# Patient Record
Sex: FEMALE | Race: WHITE | NOT HISPANIC OR LATINO | Employment: FULL TIME | URBAN - METROPOLITAN AREA
[De-identification: names, ages, dates, MRNs, and addresses within clinical notes are randomized per-mention and may not be internally consistent; named-entity substitution may affect disease eponyms.]

---

## 2018-03-01 ENCOUNTER — TELEPHONE (OUTPATIENT)
Dept: GASTROENTEROLOGY | Facility: CLINIC | Age: 48
End: 2018-03-01

## 2018-03-01 NOTE — TELEPHONE ENCOUNTER
Attempted to contact Chanell  without success.    Left message for Chanell to call the clinic back regarding patient's new patient appointment.

## 2018-03-01 NOTE — TELEPHONE ENCOUNTER
----- Message from Pam Tipton sent at 3/1/2018  9:19 AM CST -----  Contact: Chanell valdovinos/ [facility]  [phone]  Caroline Lua is asking to speak with Monika to get a update on an appt for pt. Please call Chanell

## 2018-03-02 ENCOUNTER — TELEPHONE (OUTPATIENT)
Dept: GASTROENTEROLOGY | Facility: CLINIC | Age: 48
End: 2018-03-02

## 2018-03-02 NOTE — TELEPHONE ENCOUNTER
Spoke with Chanell from Dr. Restrepo office.    Patient is scheduled to see Dr. Velazquez 4/2.    Patient is on the cancellation list for sooner appointment if one may appear open.

## 2018-03-05 ENCOUNTER — TELEPHONE (OUTPATIENT)
Dept: GASTROENTEROLOGY | Facility: CLINIC | Age: 48
End: 2018-03-05

## 2018-03-05 NOTE — TELEPHONE ENCOUNTER
Called aryan at [facility] and offered her patient a sooner appointment.    Patient is unable to make the appointment and will keep the one scheduled.

## 2018-03-05 NOTE — TELEPHONE ENCOUNTER
----- Message from Gertrude Baird sent at 3/5/2018 12:03 PM CST -----  Contact: Chanell with [facility]:[phone]  Ms. Lua with [facility] called and states she would like to say thank you and the pt will keep her appointment because the pt can't make it in tomorrow.

## 2018-04-01 NOTE — PROGRESS NOTES
[facility] Consultation Note    Reason for Consult:    Chief Complaint   Patient presents with    Nausea    Abdominal Pain    Gas    Bloated    Constipation    Weight Loss         PCP:   Chaim Castillo   [address] [facility] [place] [facility]*    Referring MD:  Rod Restrepo Jr., Md  [address]  [facility]  [place] [address]      HPI:  Chanell Jimenez is a 47 y.o. female with a PMH of anemia, asthma, back pain,  referred to motility clinic for second opinion regarding the following problems:    GERD. Pyrosis, regurgitation. Well controlled with Nexium 40 mg once daily. occ takes it BID PRN.     Nausea.  Early satiety  Frequency:constant  Onset: 2007    Vomiting  Frequency:few days per month  Onset:2007  Timing of vomiting:  Within 30 min of eating:yes   Within 3 hours after eating: no. Hours after eating when s/s started in 2007       Cyclical episodes of n/v with symptom free intervals between episodes:yes. S/s free x 4-5 months. N/v x 6-8 months  History of migraines:no PH, no FH  Marijuana use:no  Unusual bathing behaviors:no    Improves with:  some improvement with zofran 4 mg-8 mg every 6 hours  Some improvement with phenergan 12.5 -25 mg every other night  Has not tried compazine, scopolamine   No improvement reglan 10 mg TID x few months in 2007. No extrapyramidal s/e.  No improvement with erythromycin in 2007.     Some improvement with domperidone 20 mg QID. Started in 2007.  Back on it since 10/2017.  Lost efficacy over time    Gastroparesis diagnosed:  2007. After having URI  Etiology:    Dm-no  Idopathic  Postsurgical-no  Parkinsonism-no  Amyloidosis-no  Paraneoplastic disease-no  Scleroderma-no  Mesenteric ischemia-no    History of:  Prior gastric or bariatric surgery: no  Diabetes mellitus: no  Thyroid dysfunction: no  Neurological disease: no   Autoimmune disorders: no, but autoimmune crisis  s/p tubal coils for BC. Resolved after hysterectomy    Number of GP related hospitalization in the past year: none  Number of GP related ED visit in the past year: none    Interventions: (pyloroplasty, botox):some improvement with pyloric botox in 12/2016, and 3/12/2018. Eats 4-5 small portions throughout day. Avoids ground meat  No Gastric Stimulator       Curenlty on following medications that may affect gastric emptying:   Narcotics (morphine, oxycodone, tapentadol, less with tramadol): hydrocodone 7.5 mg once monthly, trazodone 50 mg HS  Anticholinergics: levsin 4-5 days monthly  Cyclosporine:no  Diabetic medications (GLP-1 analogs, Exenatide, Lixisenatide, Livaglutide, Albiglutide, DulaglutatidePramlintide - SymlinPen (injection)):no    Abdominal pain. Reports abdominal pain  Character:burning, pressure  Location:epigastric/RUQ; radiates to back  Frequency:  3-4 days weekly  Duration:30 min-few hours  Onset:since HS, but worse in 2007  Worse with:after meals.   Improves with:levsin PRN,   Associated with Bm: no  Nocturnal pain: no  Has not tried Bentyl,Levbid, IBgurd.  Antidepressants: trintellix, trazodone  Using narcotic pain medication: hydrocodone      Gas and bloating. Bothersome.  Bloating: yes  Excessive gas: on occ  Abdominal distension: yes   Belching: on occ.  Symptoms get worse after meals: yes  Symptoms get worse as the day progresses:  yes  Consumes lactose:few  Consumes artificial sugars:yes.3-4 diet sodas daily.     Constipation. Intermittently since 2007 with worsening in 2014 s/p hysterectomy. Stephenson type 1 BMs. 4-5 days weekly. Some straining. occ feelings of incomplete evacuation. No sensation of anorectal blockage. No digital evacuation.no trauma. Some improvement with linzess 145 mcg PRN. Some improvement with colace BID x few months few months ago. Has not tried trulance, amitiza, lactulose, miralax, fiber.    Weight. 5-6 lbs wt loss in the last month. Eating less due to s/s. Food  aversion.    Anxiety. Mild depression. Well controlled with Trintellix 10 mg once daily x 4 year per ob/gyn. Has seen psychiatrist and psychologist in 2004 s/p divorce.    Insomnia. Well controlled with trazodone 50 mg once nightly. Has not seen sleep specialist.     Anemia. Iron deficient. Previous GI w/o unrevealing (EGD/colon/VCE).    Denies dysphagia,  diarrhea,BRBPR, melena,     Total visit time was 90 minutes, more than 50% of which was spent in face-to-face counseling with patient regarding symptoms, diagnostic results, prognosis, risks and benefits of treatment options, instructions for management, importance of compliance with chosen treatment options, risk factor reduction, stress reduction, coping strategies.      Previous Studies:   EGD 3/12/2017: EGD 3/12/2018: Nl esophagus.  Two polyps in gastric body cw w fundic gland polyps.  100U Botox injected to pylorus.  Nl duodenum.    EGD 12/12/2016: NL esophagus. NL duodenum. Bilious fluid in gastric body otherwise nL (-). botox 100 u injected into pyloric canal.  abd us 12/2/2016: NL    Colon 2/10/2011: GPTTI. NL colon and TI. Rpt in 10 yrs.  EGD 5/2/2007: NL esophagus. NL stomach (mild chr gastritis). NL duodenum (benign mucosal lymphoid nodule). aperistaltic stomach and duodenum.   GES 10/28/08: NL. T 1/2 88 min.   abd us 7/7/08: NL  GES 6/12/07: gastroparesis. Persistent abn gastric emptying time. T 1/2 186 min.   CT abd/plevis 5/16/07: NL  GES 5/15/07:abn gastric emptying. Half time of gastri cemptying less than 50% at two hours   HIDA 5/13/07: NL  CT abd/pelvis 5/11/07: NL     ROS:  ROS   Constitutional: No fevers, no chills, no night sweats, + weight loss  ENT: + congestion, no rhinorrhea, + chronic sinus problems  CV: No chest pain, no palpitations  Pulm: No cough, no shortness of breath  Ophtho: No blurry vision, no eye redness  GI: see HPI  Derm: No rash  Heme: No lymphadenopathy, no bruising  MSK: No arthritis, no joint swelling, no Raynauds  : No  dysuria, no frequent urination, no blood in urine  Endo: No hot or cold intolerance  Neuro: No dizziness, no syncope, no seizure  Psych: + anxiety, no depression        Medical History:   Past Medical History:   Diagnosis Date    Asthma     Gastroparesis     GERD (gastroesophageal reflux disease)         Surgical History:   Past Surgical History:   Procedure Laterality Date    ANTERIOR CERVICAL CORPECTOMY W/ FUSION      BREAST SURGERY      COLONOSCOPY  2011    ESOPHAGOGASTRODUODENOSCOPY  03/2018    HAND TENDON SURGERY      HYSTERECTOMY      KNEE ARTHROSCOPY      stapedectomy      d/t otosclerosis. hearing aids in place    TRIGGER FINGER RELEASE          Family History:   Family History   Problem Relation Age of Onset    Irritable bowel syndrome Mother     Breast cancer Maternal Grandmother     Celiac disease Neg Hx     Colon cancer Neg Hx     Crohn's disease Neg Hx     Esophageal cancer Neg Hx     Inflammatory bowel disease Neg Hx     Liver cancer Neg Hx     Rectal cancer Neg Hx     Stomach cancer Neg Hx     Ulcerative colitis Neg Hx         Social History:   Social History     Social History    Marital status: Single     Spouse name: N/A    Number of children: N/A    Years of education: N/A     Social History Main Topics    Smoking status: Never Smoker    Smokeless tobacco: Never Used    Alcohol use 1.2 oz/week     2 Glasses of wine per week      Comment: few days weekly    Drug use: No    Sexual activity: Not Asked     Other Topics Concern    None     Social History Narrative    None        Review of patient's allergies indicates:  Allergies not on file    Current Outpatient Prescriptions   Medication Sig Dispense Refill    ALBUTEROL INHL Inhale into the lungs every 4 (four) hours as needed.      DOMPERIDONE, BULK, MISC 20 mg by Misc.(Non-Drug; Combo Route) route 4 (four) times daily before meals and nightly.      esomeprazole (NEXIUM) 40 MG capsule 40 mg once daily.        "fluticasone (FLONASE) 50 mcg/actuation nasal spray 1 spray once daily.       hydrocodone-acetaminophen 7.5-325mg (NORCO) 7.5-325 mg per tablet 1 tablet every 8 (eight) hours as needed.       hyoscyamine (LEVSIN/SL) 0.125 mg Subl Place 0.125 mg under the tongue every 6 (six) hours as needed.      levocetirizine (XYZAL) 5 MG tablet Take 5 mg by mouth every evening.  11    linaclotide (LINZESS) 145 mcg Cap capsule Take 145 mcg by mouth once daily.      mirabegron (MYRBETRIQ) 50 mg Tb24 Take by mouth once daily.      montelukast (SINGULAIR) 10 mg tablet 10 mg every evening.       ondansetron (ZOFRAN) 4 MG tablet every 6 (six) hours as needed.       predniSONE (DELTASONE) 10 MG tablet Take 10 mg by mouth once daily.  2    PREMARIN 0.9 mg Tab 0.9 mg once daily.       promethazine (PHENERGAN) 25 MG tablet 25 mg every 6 (six) hours as needed.       SYMBICORT 160-4.5 mcg/actuation HFAA Inhale 2 puffs into the lungs 2 (two) times daily.  11    traZODone (DESYREL) 50 MG tablet 50 mg every evening.       vortioxetine (TRINTELLIX) 10 mg Tab Take 10 mg by mouth once daily.       No current facility-administered medications for this visit.         Objective Findings:  Vital Signs:  /81   Pulse 80   Ht 5' 4" (1.626 m)   Wt 50.1 kg (110 lb 7.2 oz)   BMI 18.96 kg/m²   Body mass index is 18.96 kg/m².    Physical Exam:  General appearance: alert, cooperative, no distress  HENT: Normocephalic, atraumatic, neck symmetrical, no nasal discharge  Eyes: conjunctivae/corneas clear, PERRL, EOM's intact  Lungs: clear to auscultation bilaterally, no dullness to percussion bilaterally  Heart: regular rate and rhythm without rub; no displacement of the PMI  Abdomen: soft, non-tender; bowel sounds normoactive; no organomegaly  Extremities: extremities symmetric; no clubbing, cyanosis, or edema  Integument: Skin color, texture, turgor normal; no rashes; hair distrubution normal  Neurologic: Alert and oriented X 3, normal " strength, normal coordination and gait  Psychiatric: no pressured speech; normal affect; no evidence of impaired cognition    Labs:  Lab Results   Component Value Date    WBC 7.17 04/02/2018    HGB 12.8 04/02/2018    HCT 37.6 04/02/2018    MCV 92 04/02/2018     04/02/2018     Lab Results   Component Value Date    FERRITIN 59 04/02/2018     Lab Results   Component Value Date     (L) 04/02/2018    K 4.3 04/02/2018    CL 97 04/02/2018    CO2 30 (H) 04/02/2018    GLU 94 04/02/2018    BUN 10 04/02/2018    CREATININE 0.7 04/02/2018    CALCIUM 9.7 04/02/2018    PROT 8.0 04/02/2018    ALBUMIN 3.9 04/02/2018    BILITOT 0.4 04/02/2018    ALKPHOS 54 (L) 04/02/2018    AST 17 04/02/2018    ALT 14 04/02/2018     Lab Results   Component Value Date    TSH 1.146 04/02/2018     No results found for: SEDRATE  No results found for: CRP  No results found for: LABA1C, HGBA1C        Assessment and Plan:  Chanell Jimenez is a 47 y.o. female with  with a PMH of anemia, asthma, back pain, referred to motility clinic for second opinion regarding the following problems:    GERD. Well controlled   -Cont Nexium 40 mg daily-BID.    Nausea (constant).  Early satiety (daily). Vomiting (few days monthly). History of post infectious gastroparesis (GES delayed 2007.  GES in 2008 normal).  Bile in stomach on EGD 12/2016.   -Some improvement after pyloric botox in 2012. No improvement w Botox 3/2018  No improvement reglan 10 mg TID  No improvement with erythromycin in 2007.    -On domperidone 20 mg QID (Initially w significant improvement, recently domperidone has lost some efficacy)  -Cont zofran 4 mg-8 mg every 6 hours  -Cont phenergan 12.5 -25 mg every other night  -Start FDguard  -Check smartpill off narcotics and trazodone and domperidone. GES if smart pill not covered.  -Referral to GI dietitian for gastroparesis diet. Handout provided  -Will consider compazine, scopolamine   -Will consider gastric stimulator +/- pyloroplasty if  evidence of dysmotility  -Will consider treatment of bile gastritis and functional dyspepsia    -Will consider repeat EGD w biopsies  -Will consider CT    Epigastric abdominal pain.   Some improvement with levsin PRN.  -FDgard PRN  -Infrequent narcotic use, would avoid    Gas, bloating and distention.  -Eliminate artificial sugars and lactose.  -Will consider SIBO testing    Constipation.   -Increase linzess 145 mcg to daily.    Weight. 5-6 lbs wt loss in the last month. Poor PO intake due to gastro symptoms. Some food aversion.  -Will continue to monitor  -Ref to dietitian     Anxiety. Mild depression.   Well controlled with Trintellix 10 (SSRi)  Has seen psychiatrist and psychologist in 2004   -Will consider referral to psychology    Insomnia.   Well controlled with trazodone 50 mg once nightly.  -Trazodone could be contributing to symptoms.  Will consider holding and ref to sleep specialist.       Anemia. EGD/colon/video capsule endoscopy unrevealing.  -Check labs    Follow-up in about 2 months (around 6/2/2018) for Motility with Dr. Velazquez.    1. Nausea and vomiting, intractability of vomiting not specified, unspecified vomiting type    2. Upper abdominal pain    3. Constipation, unspecified constipation type    4. Iron deficiency anemia, unspecified iron deficiency anemia type    5. Abdominal bloating    6. Gastroesophageal reflux disease without esophagitis          Order summary:  Orders Placed This Encounter    TSH    Tissue transglutaminase, IgA    IgA    CBC auto differential    Comprehensive metabolic panel    Iron and TIBC    Ferritin         Thank you so much for allowing me to participate in the care of Chanell Carbonepopeye Brandt, MATEO, FNP-C  Ananbelle Velazquez MD

## 2018-04-02 ENCOUNTER — LAB VISIT (OUTPATIENT)
Dept: LAB | Facility: HOSPITAL | Age: 48
End: 2018-04-02
Payer: COMMERCIAL

## 2018-04-02 ENCOUNTER — OFFICE VISIT (OUTPATIENT)
Dept: GASTROENTEROLOGY | Facility: CLINIC | Age: 48
End: 2018-04-02
Payer: COMMERCIAL

## 2018-04-02 VITALS
SYSTOLIC BLOOD PRESSURE: 117 MMHG | HEART RATE: 80 BPM | DIASTOLIC BLOOD PRESSURE: 81 MMHG | BODY MASS INDEX: 18.85 KG/M2 | HEIGHT: 64 IN | WEIGHT: 110.44 LBS

## 2018-04-02 DIAGNOSIS — R10.10 UPPER ABDOMINAL PAIN: ICD-10-CM

## 2018-04-02 DIAGNOSIS — R11.2 NAUSEA AND VOMITING, INTRACTABILITY OF VOMITING NOT SPECIFIED, UNSPECIFIED VOMITING TYPE: Primary | ICD-10-CM

## 2018-04-02 DIAGNOSIS — K21.9 GASTROESOPHAGEAL REFLUX DISEASE WITHOUT ESOPHAGITIS: ICD-10-CM

## 2018-04-02 DIAGNOSIS — R11.2 NAUSEA AND VOMITING, INTRACTABILITY OF VOMITING NOT SPECIFIED, UNSPECIFIED VOMITING TYPE: ICD-10-CM

## 2018-04-02 DIAGNOSIS — K59.00 CONSTIPATION, UNSPECIFIED CONSTIPATION TYPE: ICD-10-CM

## 2018-04-02 DIAGNOSIS — D50.9 IRON DEFICIENCY ANEMIA, UNSPECIFIED IRON DEFICIENCY ANEMIA TYPE: ICD-10-CM

## 2018-04-02 DIAGNOSIS — R14.0 ABDOMINAL BLOATING: ICD-10-CM

## 2018-04-02 LAB
ALBUMIN SERPL BCP-MCNC: 3.9 G/DL
ALP SERPL-CCNC: 54 U/L
ALT SERPL W/O P-5'-P-CCNC: 14 U/L
ANION GAP SERPL CALC-SCNC: 8 MMOL/L
AST SERPL-CCNC: 17 U/L
BASOPHILS # BLD AUTO: 0.04 K/UL
BASOPHILS NFR BLD: 0.6 %
BILIRUB SERPL-MCNC: 0.4 MG/DL
BUN SERPL-MCNC: 10 MG/DL
CALCIUM SERPL-MCNC: 9.7 MG/DL
CHLORIDE SERPL-SCNC: 97 MMOL/L
CO2 SERPL-SCNC: 30 MMOL/L
CREAT SERPL-MCNC: 0.7 MG/DL
DIFFERENTIAL METHOD: ABNORMAL
EOSINOPHIL # BLD AUTO: 0 K/UL
EOSINOPHIL NFR BLD: 0.4 %
ERYTHROCYTE [DISTWIDTH] IN BLOOD BY AUTOMATED COUNT: 12.3 %
EST. GFR  (AFRICAN AMERICAN): >60 ML/MIN/1.73 M^2
EST. GFR  (NON AFRICAN AMERICAN): >60 ML/MIN/1.73 M^2
FERRITIN SERPL-MCNC: 59 NG/ML
GLUCOSE SERPL-MCNC: 94 MG/DL
HCT VFR BLD AUTO: 37.6 %
HGB BLD-MCNC: 12.8 G/DL
IGA SERPL-MCNC: 358 MG/DL
IMM GRANULOCYTES # BLD AUTO: 0.03 K/UL
IMM GRANULOCYTES NFR BLD AUTO: 0.4 %
IRON SERPL-MCNC: 109 UG/DL
LYMPHOCYTES # BLD AUTO: 1.6 K/UL
LYMPHOCYTES NFR BLD: 22.7 %
MCH RBC QN AUTO: 31.3 PG
MCHC RBC AUTO-ENTMCNC: 34 G/DL
MCV RBC AUTO: 92 FL
MONOCYTES # BLD AUTO: 0.4 K/UL
MONOCYTES NFR BLD: 6.1 %
NEUTROPHILS # BLD AUTO: 5 K/UL
NEUTROPHILS NFR BLD: 69.8 %
NRBC BLD-RTO: 0 /100 WBC
PLATELET # BLD AUTO: 235 K/UL
PMV BLD AUTO: 10 FL
POTASSIUM SERPL-SCNC: 4.3 MMOL/L
PROT SERPL-MCNC: 8 G/DL
RBC # BLD AUTO: 4.09 M/UL
SATURATED IRON: 26 %
SODIUM SERPL-SCNC: 135 MMOL/L
TOTAL IRON BINDING CAPACITY: 414 UG/DL
TRANSFERRIN SERPL-MCNC: 280 MG/DL
TSH SERPL DL<=0.005 MIU/L-ACNC: 1.15 UIU/ML
WBC # BLD AUTO: 7.17 K/UL

## 2018-04-02 PROCEDURE — 83516 IMMUNOASSAY NONANTIBODY: CPT

## 2018-04-02 PROCEDURE — 99999 PR PBB SHADOW E&M-EST. PATIENT-LVL IV: CPT | Mod: PBBFAC,,, | Performed by: INTERNAL MEDICINE

## 2018-04-02 PROCEDURE — 80053 COMPREHEN METABOLIC PANEL: CPT

## 2018-04-02 PROCEDURE — 82784 ASSAY IGA/IGD/IGG/IGM EACH: CPT

## 2018-04-02 PROCEDURE — 82728 ASSAY OF FERRITIN: CPT

## 2018-04-02 PROCEDURE — 85025 COMPLETE CBC W/AUTO DIFF WBC: CPT

## 2018-04-02 PROCEDURE — 84443 ASSAY THYROID STIM HORMONE: CPT

## 2018-04-02 PROCEDURE — 99244 OFF/OP CNSLTJ NEW/EST MOD 40: CPT | Mod: S$GLB,,, | Performed by: INTERNAL MEDICINE

## 2018-04-02 PROCEDURE — 83540 ASSAY OF IRON: CPT

## 2018-04-02 RX ORDER — HYDROCODONE BITARTRATE AND ACETAMINOPHEN 7.5; 325 MG/1; MG/1
1 TABLET ORAL EVERY 8 HOURS PRN
COMMUNITY
Start: 2018-03-15 | End: 2022-06-23

## 2018-04-02 RX ORDER — ONDANSETRON 4 MG/1
TABLET, FILM COATED ORAL EVERY 6 HOURS PRN
COMMUNITY
Start: 2018-03-12

## 2018-04-02 RX ORDER — ESTROGENS, CONJUGATED 0.9 MG/1
0.9 TABLET, FILM COATED ORAL DAILY
COMMUNITY
Start: 2018-03-03

## 2018-04-02 RX ORDER — PROMETHAZINE HYDROCHLORIDE 25 MG/1
25 TABLET ORAL EVERY 6 HOURS PRN
COMMUNITY
Start: 2018-03-20

## 2018-04-02 RX ORDER — HYOSCYAMINE SULFATE 0.12 MG/1
0.12 TABLET SUBLINGUAL EVERY 6 HOURS PRN
COMMUNITY

## 2018-04-02 RX ORDER — ESOMEPRAZOLE MAGNESIUM 40 MG/1
40 CAPSULE, DELAYED RELEASE ORAL DAILY
COMMUNITY
Start: 2018-03-03

## 2018-04-02 RX ORDER — LEVOCETIRIZINE DIHYDROCHLORIDE 5 MG/1
5 TABLET, FILM COATED ORAL NIGHTLY
Refills: 11 | COMMUNITY
Start: 2018-01-26 | End: 2022-06-23

## 2018-04-02 RX ORDER — FLUTICASONE PROPIONATE 50 MCG
1 SPRAY, SUSPENSION (ML) NASAL DAILY
COMMUNITY
Start: 2018-03-20

## 2018-04-02 RX ORDER — MONTELUKAST SODIUM 10 MG/1
10 TABLET ORAL NIGHTLY
COMMUNITY
Start: 2018-03-03

## 2018-04-02 RX ORDER — PREDNISONE 10 MG/1
10 TABLET ORAL DAILY
Refills: 2 | COMMUNITY
Start: 2017-12-27

## 2018-04-02 RX ORDER — BUDESONIDE AND FORMOTEROL FUMARATE DIHYDRATE 160; 4.5 UG/1; UG/1
2 AEROSOL RESPIRATORY (INHALATION) 2 TIMES DAILY
Refills: 11 | COMMUNITY
Start: 2017-12-27 | End: 2022-06-23

## 2018-04-02 RX ORDER — TRAZODONE HYDROCHLORIDE 50 MG/1
50 TABLET ORAL NIGHTLY
COMMUNITY
Start: 2018-03-20

## 2018-04-02 NOTE — LETTER
April 2, 2018      Rod Restrepo Jr., MD  [address]  [address]  [facility]  [facility] [address]           [facility]  [address]  [place] [address]  Phone: [phone]  Fax: [phone]          Patient: Chanell Jimenez   MR Number: [id number]   YOB: 1970   Date of Visit: 4/2/2018       Dear Dr. Rod Restrepo Jr.:    Thank you for referring Chanell Jimenez to me for evaluation. Attached you will find relevant portions of my assessment and plan of care.    If you have questions, please do not hesitate to call me. I look forward to following Chanell Jimenez along with you.    Sincerely,    Annabelle Velazquez MD    Enclosure  CC:  No Recipients    If you would like to receive this communication electronically, please contact [email or web address] or [phone] to request more information on [employer] access.    For providers and/or their staff who would like to refer a patient to [facility], please contact us through our one-stop-shop provider referral line, [facility], at [phone].    If you feel you have received this communication in error or would no longer like to receive these types of communications, please e-mail [email or web address]

## 2018-04-02 NOTE — PATIENT INSTRUCTIONS
Take over the counter FDgard for the abdominal discomfort.    Eliminate artifical sugars (ie diet sodas) and dairy products to see if this can help with the abdominal bloating.    Take the linzess every day for the constipation.

## 2018-04-03 LAB — TTG IGA SER IA-ACNC: 3 UNITS

## 2018-04-10 ENCOUNTER — NUTRITION (OUTPATIENT)
Dept: GASTROENTEROLOGY | Facility: CLINIC | Age: 48
End: 2018-04-10

## 2018-04-10 ENCOUNTER — TELEPHONE (OUTPATIENT)
Dept: GASTROENTEROLOGY | Facility: CLINIC | Age: 48
End: 2018-04-10

## 2018-04-10 VITALS — BODY MASS INDEX: 19.5 KG/M2 | HEIGHT: 64 IN | WEIGHT: 114.19 LBS

## 2018-04-10 DIAGNOSIS — R14.0 ABDOMINAL BLOATING: ICD-10-CM

## 2018-04-10 DIAGNOSIS — R11.2 NAUSEA AND VOMITING, INTRACTABILITY OF VOMITING NOT SPECIFIED, UNSPECIFIED VOMITING TYPE: Primary | ICD-10-CM

## 2018-04-10 DIAGNOSIS — R11.2 NON-INTRACTABLE VOMITING WITH NAUSEA, UNSPECIFIED VOMITING TYPE: Primary | ICD-10-CM

## 2018-04-10 DIAGNOSIS — K31.84 GASTROPARESIS: ICD-10-CM

## 2018-04-10 PROCEDURE — 99999 PR PBB SHADOW E&M-EST. PATIENT-LVL I: CPT | Mod: PBBFAC,,,

## 2018-04-10 NOTE — PROGRESS NOTES
Nutrition Assessment for Medical Nutrition Therapy    Referring professional: Armida Velazquez M.D.     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      Medical History:     Past Medical History:   Diagnosis Date    Asthma     Gastroparesis     GERD (gastroesophageal reflux disease)        Past Surgical History:   Procedure Laterality Date    ANTERIOR CERVICAL CORPECTOMY W/ FUSION      BREAST SURGERY      COLONOSCOPY  2011    ESOPHAGOGASTRODUODENOSCOPY  03/2018    HAND TENDON SURGERY      HYSTERECTOMY      KNEE ARTHROSCOPY      stapedectomy      d/t otosclerosis. hearing aids in place    TRIGGER FINGER RELEASE            Pertinent Medications:   Current Outpatient Prescriptions on File Prior to Visit   Medication Sig Dispense Refill    ALBUTEROL INHL Inhale into the lungs every 4 (four) hours as needed.      DOMPERIDONE, BULK, MISC 20 mg by Misc.(Non-Drug; Combo Route) route 4 (four) times daily before meals and nightly.      esomeprazole (NEXIUM) 40 MG capsule 40 mg once daily.       fluticasone (FLONASE) 50 mcg/actuation nasal spray 1 spray once daily.       hydrocodone-acetaminophen 7.5-325mg (NORCO) 7.5-325 mg per tablet 1 tablet every 8 (eight) hours as needed.       hyoscyamine (LEVSIN/SL) 0.125 mg Subl Place 0.125 mg under the tongue every 6 (six) hours as needed.      levocetirizine (XYZAL) 5 MG tablet Take 5 mg by mouth every evening.  11    linaclotide (LINZESS) 145 mcg Cap capsule Take 145 mcg by mouth once  "daily.      mirabegron (MYRBETRIQ) 50 mg Tb24 Take by mouth once daily.      montelukast (SINGULAIR) 10 mg tablet 10 mg every evening.       ondansetron (ZOFRAN) 4 MG tablet every 6 (six) hours as needed.       predniSONE (DELTASONE) 10 MG tablet Take 10 mg by mouth once daily.  2    PREMARIN 0.9 mg Tab 0.9 mg once daily.       promethazine (PHENERGAN) 25 MG tablet 25 mg every 6 (six) hours as needed.       SYMBICORT 160-4.5 mcg/actuation HFAA Inhale 2 puffs into the lungs 2 (two) times daily.  11    traZODone (DESYREL) 50 MG tablet 50 mg every evening.       vortioxetine (TRINTELLIX) 10 mg Tab Take 10 mg by mouth once daily.       No current facility-administered medications on file prior to visit.        Vitamins/Supplements/Herbs: Vital proteins collagen supplement     Food intolerances or allergies:   Review of patient's allergies indicates:   Allergen Reactions    Celebrex [celecoxib] Itching    Chromium sutures [surgical stainless steel] Other (See Comments)     Autoimmune crisis    Fetzima [levomilnacipran] Palpitations       Labs:      Chemistry        Component Value Date/Time     (L) 04/02/2018 1144    K 4.3 04/02/2018 1144    CL 97 04/02/2018 1144    CO2 30 (H) 04/02/2018 1144    BUN 10 04/02/2018 1144    CREATININE 0.7 04/02/2018 1144    GLU 94 04/02/2018 1144        Component Value Date/Time    CALCIUM 9.7 04/02/2018 1144    ALKPHOS 54 (L) 04/02/2018 1144    AST 17 04/02/2018 1144    ALT 14 04/02/2018 1144    BILITOT 0.4 04/02/2018 1144    ESTGFRAFRICA >60.0 04/02/2018 1144    EGFRNONAA >60.0 04/02/2018 1144          No results found for: CHOL  No results found for: HDL  No results found for: LDLCALC  No results found for: TRIG  No results found for: CHOLHDL  No results found for: HGBA1C               Ht: Height: 5' 4" (162.6 cm)  Wt: Weight: 51.8 kg (114 lb 3.2 oz)  Last 3 Weights:   Wt Readings from Last 3 Encounters:   04/10/18 51.8 kg (114 lb 3.2 oz)   04/02/18 50.1 kg (110 lb 7.2 " "oz)     BMI: Body mass index is 19.6 kg/m².    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 Reports awakening with nausea in am relieved by ingestion of dry crackers ; preferred foods in the past - salads and fresh fruits ; reports experience with ground beef getting "stuck" in her throat and for this reason avoids this food item. Generally not a dessert eater ; consumes water as her primary liquid ; 1-2 glasses of wine per week maximum; she and  dine out on weekends ( steak, seafood); used to consume yogurt and cheese regularly but has eliminated such in past weeks per Dr Velazquez's suggestion.     Meal patterns: 1-2 meals daily  Breakfast: crackers or toast ; fresh fruit   Lunch: brought from home ( grilled chicken , plain pasta)   Dinner: nothing or may consume protein food prepared for dinner in plain form   Bowel Function :   Constipation    Meal preparation/shopping: self    Nutrition beverages: mainly drinks water     Dining out: Reduced, 2-3 times per week    Smoking/alcohol:  Social History   Substance Use Topics    Smoking status: Never Smoker    Smokeless tobacco: Never Used    Alcohol use 1.2 oz/week     2 Glasses of wine per week      Comment: few days weekly       D = Nutrition Diagnosis  Patient Assessment: Chanell Jimenez  was referred 2./2  gastroparesis and subsequent weight loss. Per interview, patient confirms her appetite is decreased 2/2 full feelings quickly and nausea post prandially which persists for hours following meal consumption.  Per diet recall, patient is not meeting necessary calorie needs for weight maintenance. Session a spent discussing strategies for increased PO intake without pain or GI discomfort. Patient verbalized " understanding and compliance expected. Contact information provided for any questions or concerns    Primary Problem:  Alerted GI functions  Etiology: Related to  Inappropriate intake of foods 2/2 gastroparesis  Signs/symptoms: As evidenced by  patient statement of early satiety and morning nausea    Secondary problem: Unintentional weight loss  Etiology: related to:  decreased PO intake 2/2 pain with eating   Signs/ Symptoms: As evidenced by patient stated wt loss    Education Materials provided:   1.[facility] Nutrition Gastroparesis   2.Possible meal replacement products ( Pronourish)    3.Lean protein sources low in fat           I = Nutrition Intervention  Calorie Requirements:1500 kcal/day (25-30Kcal/kg)  Protein requirements: 1-2 g/day (g/kg)50-60 grams    Recommendation #1 Eat 4-6 small meals daily to allow time for stomach emptying and increased PO intake    Recommendation #2 Avoud high fat and high fiber foods 2/2 slow movement form stomach and possibility for GI pain/discomfort    Recommendation #3 Add smooth, pureed and liquid foods to diet to increase PO intake 2/2 decrease transit time in stomach    Recommendation #4 possible use of liquid meal replacement for am /pm  meal following consumption of dry crackers  Upon awakening to settle stomach    Recommendation #5 discussed use of cooked vegetables/fruit; use of immersion  and crock pot for further breakdown of insoluble fiber.     Recommendation #1 Discussed strategy of consuming more solid foods earlier in the day to allow ample time for gastric emptying   .   M = Nutrition Monitoring   Indicator 1. Weight   Indicator 2.  PO intake/GI tolerance     E= Nutrition Evaluation  Goal 1. No further unintentional weigh loss   Goal 2.  Patient can tolerate decreased PO intake with GI discomfort or refusal      Consultation Time:60 Minutes  F/U: as needed

## 2018-04-10 NOTE — TELEPHONE ENCOUNTER
Smart pill ordered per request of Radha Flores NP.  She needs to be off of narcotics prior.    I have ordered.  Can we please see if this is authorized?

## 2018-06-06 ENCOUNTER — TELEPHONE (OUTPATIENT)
Dept: GASTROENTEROLOGY | Facility: CLINIC | Age: 48
End: 2018-06-06

## 2018-06-06 NOTE — TELEPHONE ENCOUNTER
Message received from Terri June.             [6/6/2018 11:45 AM]  Terri June:    Hello. The referral for patient Chanell Jimenez was denied. The medical director stated that after medical review it was determined to be not medically necessary.    [id number]     [6/6/2018 11:47 AM]    Ok, I will inform the NP. Thanks      [6/6/2018 11:47 AM]  Terri June:    Yancy

## 2018-06-06 NOTE — TELEPHONE ENCOUNTER
Spoke with patient and patient verbalizes understanding about GES.    Patient will call back to schedule GES and follow up with Dr. Velazquez.

## 2018-06-06 NOTE — TELEPHONE ENCOUNTER
Since smart pill not covered, we will obtain a GES instead. Order placed. Please contact pt for coordination.    Thanks,  Tiff, NP

## 2022-06-23 ENCOUNTER — OFFICE VISIT (OUTPATIENT)
Dept: RHEUMATOLOGY | Facility: CLINIC | Age: 52
End: 2022-06-23
Payer: COMMERCIAL

## 2022-06-23 ENCOUNTER — HOSPITAL ENCOUNTER (OUTPATIENT)
Dept: RADIOLOGY | Facility: HOSPITAL | Age: 52
Discharge: HOME | End: 2022-06-23
Attending: INTERNAL MEDICINE
Payer: COMMERCIAL

## 2022-06-23 VITALS
BODY MASS INDEX: 23.2 KG/M2 | SYSTOLIC BLOOD PRESSURE: 145 MMHG | WEIGHT: 135.13 LBS | DIASTOLIC BLOOD PRESSURE: 78 MMHG | HEART RATE: 76 BPM

## 2022-06-23 DIAGNOSIS — I73.00 RAYNAUD'S PHENOMENON WITHOUT GANGRENE: ICD-10-CM

## 2022-06-23 DIAGNOSIS — M35.00 SICCA COMPLEX: ICD-10-CM

## 2022-06-23 DIAGNOSIS — K31.84 GASTROPARESIS: ICD-10-CM

## 2022-06-23 DIAGNOSIS — M19.049 ARTHRITIS OF HAND: ICD-10-CM

## 2022-06-23 DIAGNOSIS — M19.049 ARTHRITIS OF HAND: Primary | ICD-10-CM

## 2022-06-23 PROCEDURE — 1159F PR MEDICATION LIST DOCUMENTED IN MEDICAL RECORD: ICD-10-PCS | Mod: CPTII,S$GLB,, | Performed by: INTERNAL MEDICINE

## 2022-06-23 PROCEDURE — 73130 X-RAY EXAM OF HAND: CPT | Mod: TC,50

## 2022-06-23 PROCEDURE — 99999 PR PBB SHADOW E&M-NEW PATIENT-LVL III: CPT | Mod: PBBFAC,,, | Performed by: INTERNAL MEDICINE

## 2022-06-23 PROCEDURE — 3008F PR BODY MASS INDEX (BMI) DOCUMENTED: ICD-10-PCS | Mod: CPTII,S$GLB,, | Performed by: INTERNAL MEDICINE

## 2022-06-23 PROCEDURE — 99999 PR PBB SHADOW E&M-NEW PATIENT-LVL III: ICD-10-PCS | Mod: PBBFAC,,, | Performed by: INTERNAL MEDICINE

## 2022-06-23 PROCEDURE — 3077F SYST BP >= 140 MM HG: CPT | Mod: CPTII,S$GLB,, | Performed by: INTERNAL MEDICINE

## 2022-06-23 PROCEDURE — 3078F DIAST BP <80 MM HG: CPT | Mod: CPTII,S$GLB,, | Performed by: INTERNAL MEDICINE

## 2022-06-23 PROCEDURE — 99204 PR OFFICE/OUTPT VISIT, NEW, LEVL IV, 45-59 MIN: ICD-10-PCS | Mod: S$GLB,,, | Performed by: INTERNAL MEDICINE

## 2022-06-23 PROCEDURE — 3078F PR MOST RECENT DIASTOLIC BLOOD PRESSURE < 80 MM HG: ICD-10-PCS | Mod: CPTII,S$GLB,, | Performed by: INTERNAL MEDICINE

## 2022-06-23 PROCEDURE — 73130 X-RAY EXAM OF HAND: CPT | Mod: 26,,, | Performed by: RADIOLOGY

## 2022-06-23 PROCEDURE — 3077F PR MOST RECENT SYSTOLIC BLOOD PRESSURE >= 140 MM HG: ICD-10-PCS | Mod: CPTII,S$GLB,, | Performed by: INTERNAL MEDICINE

## 2022-06-23 PROCEDURE — 3008F BODY MASS INDEX DOCD: CPT | Mod: CPTII,S$GLB,, | Performed by: INTERNAL MEDICINE

## 2022-06-23 PROCEDURE — 99204 OFFICE O/P NEW MOD 45 MIN: CPT | Mod: S$GLB,,, | Performed by: INTERNAL MEDICINE

## 2022-06-23 PROCEDURE — 1159F MED LIST DOCD IN RCRD: CPT | Mod: CPTII,S$GLB,, | Performed by: INTERNAL MEDICINE

## 2022-06-23 PROCEDURE — 73130 XR HAND COMPLETE 3 VIEWS BILATERAL: ICD-10-PCS | Mod: 26,,, | Performed by: RADIOLOGY

## 2022-06-23 RX ORDER — BUPROPION HYDROCHLORIDE 150 MG/1
TABLET ORAL
COMMUNITY

## 2022-06-23 RX ORDER — DEXLANSOPRAZOLE 60 MG/1
CAPSULE, DELAYED RELEASE ORAL
COMMUNITY

## 2022-06-23 RX ORDER — ERGOCALCIFEROL 1.25 MG/1
CAPSULE ORAL
COMMUNITY
Start: 2019-01-01

## 2022-06-23 RX ORDER — LANSOPRAZOLE 30 MG/1
30 CAPSULE, DELAYED RELEASE ORAL DAILY
COMMUNITY
Start: 2022-06-14

## 2022-06-23 RX ORDER — CALCIUM CARBONATE 300MG(750)
TABLET,CHEWABLE ORAL
COMMUNITY
Start: 2020-12-01

## 2022-06-23 RX ORDER — BUDESONIDE AND FORMOTEROL FUMARATE DIHYDRATE 80; 4.5 UG/1; UG/1
AEROSOL RESPIRATORY (INHALATION)
COMMUNITY
Start: 2021-01-01

## 2022-06-23 ASSESSMENT — ROUTINE ASSESSMENT OF PATIENT INDEX DATA (RAPID3)
PAIN SCORE: 0.5
MDHAQ FUNCTION SCORE: 0
PSYCHOLOGICAL DISTRESS SCORE: 0
FATIGUE SCORE: 0
TOTAL RAPID3 SCORE: 0.17
AM STIFFNESS SCORE: 0, NO
PATIENT GLOBAL ASSESSMENT SCORE: 0

## 2022-06-23 NOTE — PROGRESS NOTES
Rapid3 Question Responses and Scores 6/16/2022   MDHAQ Score 0   Psychologic Score 0   Pain Score 0.5   When you awakened in the morning OVER THE LAST WEEK, did you feel stiff? No   Fatigue Score 0   Global Health Score 0   RAPID3 Score 0.17           Answers for HPI/ROS submitted by the patient on 6/16/2022  fever: No  eye redness: No  mouth sores: No  headaches: No  shortness of breath: No  chest pain: No  trouble swallowing: No  diarrhea: No  constipation: Yes  unexpected weight change: No  genital sore: No  dysuria: No  During the last 3 days, have you had a skin rash?: Yes  Bruises or bleeds easily: No  cough: No

## 2022-06-25 NOTE — PROGRESS NOTES
History of present illness:  51-year-old female complains of pain and swelling in her 5th PIP bilaterally, worse on the right than on the left.  She has occasional pain in the right 4th toe is well.  This is been going on for the past year.  She has no problems in her other fingers.  She has a long history of neck problems and had a previous cervical fusion.  She has occasional pain in the shoulder at night.  She has occasional pain in the knee.  Her pain tends to be worse at the end of the day.  It is not wake her up at night.  She denies any morning pain or morning stiffness.    She is taking no medication for her pain.  She is trying to avoid NSAIDs.  She has not used heat, ice, or topical medications.    She also has a history of Raynaud's phenomena.  This may occur several times a week.  It occurs in both the hands in the low toes.  She has had no digital ulcers.    She has had no unexplained fevers.  She complains of frontal headache.  She has a rash on her face that has been diagnosed as seborrheic dermatitis.  She is seeing a dermatologist.  She has no conjunctivitis or oral ulcers.  She complains of dry eye, mouth, nose, vagina, and skin.  She has no pleurisy, chronic or bloody diarrhea, vaginal discharge or ulcers, numbness or tingling.  She has no thrombophlebitis.  She is a  2 para 2 her mother had osteoarthritis.    Systems review:  General:  Weight has been stable  Respiratory:  Has a history of asthma but has not bothered her recently.  GI:  Has reflux esophagitis.  She has also been diagnosed as having gastro paresis.  She has had no dysphagia.  She has no liver problems.  :  No kidney or bladder problems    Physical examination:  Skin:  No rashes.  ENT:  Adequate tears and saliva.  No conjunctivitis or oral ulcers.  Able to wrinkle forehead.  Normal mouth opening.  Chest:  Clear to auscultation   Cardiac:  No murmurs, gallops, rubs  Abdomen:  No organomegaly or masses.  No tenderness to  palpation  Extremities:  No pedal edema.  No sclerodactyly.  No skin tightness on the hands or arms.  Musculoskeletal:  Cervical spine has some decreased range of motion but no pain on range of motion.  Shoulders, elbows, wrists are unremarkable.  She has bony hypertrophy of the 5th D IP, more on the right than on the left.  There is erythema but no increased warmth.  She has a synovial cyst of the right 5th D IP.  She has bony hypertrophy of the IP joint of thumb but no swelling.  She has deformity of her right 4th finger due to injury.  Lumbar spine has good range of motion without pain on range of motion.  Hips, knees, ankles are unremarkable.  She is tender in the right 4th toe but has no swelling.  Capillary microscopy was unremarkable.    Assessment:  1. 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  She could have psoriatic arthritis but she has no history of psoriasis.  2. She has a history of Raynaud's phenomena.  She does have symptoms of dryness but no decreased tears or saliva.  She also has gastro paresis.  This brings up the possibility of an underlying connective tissue disease.    Plans:  1. Laboratory studies obtained  2. I ordered x-ray of the hands  3. I recommend soaking her hands in warm water  4. I recommend the use of Voltaren gel  5. I did not give her regular return appointment but would be happy to see her in follow-up necessary.      Answers for HPI/ROS submitted by the patient on 6/16/2022  fever: No  eye redness: No  mouth sores: No  headaches: No  shortness of breath: No  chest pain: No  trouble swallowing: No  diarrhea: No  constipation: Yes  unexpected weight change: No  genital sore: No  dysuria: No  During the last 3 days, have you had a skin rash?: Yes  Bruises or bleeds easily: No  cough: No